# Patient Record
Sex: MALE | Race: WHITE | NOT HISPANIC OR LATINO | Employment: FULL TIME | URBAN - METROPOLITAN AREA
[De-identification: names, ages, dates, MRNs, and addresses within clinical notes are randomized per-mention and may not be internally consistent; named-entity substitution may affect disease eponyms.]

---

## 2017-03-28 ENCOUNTER — HISTORICAL (OUTPATIENT)
Dept: ADMINISTRATIVE | Facility: HOSPITAL | Age: 27
End: 2017-03-28

## 2017-08-18 ENCOUNTER — HISTORICAL (OUTPATIENT)
Dept: LAB | Facility: HOSPITAL | Age: 27
End: 2017-08-18

## 2022-04-07 ENCOUNTER — HISTORICAL (OUTPATIENT)
Dept: ADMINISTRATIVE | Facility: HOSPITAL | Age: 32
End: 2022-04-07

## 2022-04-24 VITALS — OXYGEN SATURATION: 99 % | SYSTOLIC BLOOD PRESSURE: 145 MMHG | DIASTOLIC BLOOD PRESSURE: 78 MMHG

## 2024-01-05 ENCOUNTER — OFFICE VISIT (OUTPATIENT)
Dept: URGENT CARE | Facility: CLINIC | Age: 34
End: 2024-01-05
Payer: COMMERCIAL

## 2024-01-05 VITALS
HEIGHT: 69 IN | WEIGHT: 135 LBS | OXYGEN SATURATION: 99 % | HEART RATE: 84 BPM | TEMPERATURE: 98 F | SYSTOLIC BLOOD PRESSURE: 142 MMHG | RESPIRATION RATE: 18 BRPM | BODY MASS INDEX: 19.99 KG/M2 | DIASTOLIC BLOOD PRESSURE: 95 MMHG

## 2024-01-05 DIAGNOSIS — M54.9 BACK PAIN, UNSPECIFIED BACK LOCATION, UNSPECIFIED BACK PAIN LATERALITY, UNSPECIFIED CHRONICITY: Primary | ICD-10-CM

## 2024-01-05 LAB
BILIRUB UR QL STRIP: NEGATIVE
GLUCOSE UR QL STRIP: NEGATIVE
KETONES UR QL STRIP: NEGATIVE
LEUKOCYTE ESTERASE UR QL STRIP: NEGATIVE
PH, POC UA: 7
POC BLOOD, URINE: NEGATIVE
POC NITRATES, URINE: NEGATIVE
PROT UR QL STRIP: NEGATIVE
SP GR UR STRIP: 1 (ref 1–1.03)
UROBILINOGEN UR STRIP-ACNC: NORMAL (ref 0.3–2.2)

## 2024-01-05 PROCEDURE — 99203 OFFICE O/P NEW LOW 30 MIN: CPT | Mod: ,,, | Performed by: PHYSICIAN ASSISTANT

## 2024-01-05 PROCEDURE — 81003 URINALYSIS AUTO W/O SCOPE: CPT | Mod: QW,,, | Performed by: PHYSICIAN ASSISTANT

## 2024-01-05 RX ORDER — METHOCARBAMOL 500 MG/1
1000 TABLET, FILM COATED ORAL 3 TIMES DAILY
Qty: 30 TABLET | Refills: 0 | Status: SHIPPED | OUTPATIENT
Start: 2024-01-05 | End: 2024-01-10

## 2024-01-05 RX ORDER — PREDNISONE 10 MG/1
10 TABLET ORAL 2 TIMES DAILY
Qty: 10 TABLET | Refills: 0 | Status: SHIPPED | OUTPATIENT
Start: 2024-01-05 | End: 2024-01-10

## 2024-01-05 NOTE — PROGRESS NOTES
"Subjective:      Patient ID: Chay Bailey is a 33 y.o. male.    Vitals:  height is 5' 9" (1.753 m) and weight is 61.2 kg (135 lb). His oral temperature is 98.1 °F (36.7 °C). His blood pressure is 142/95 (abnormal) and his pulse is 84. His respiration is 18 and oxygen saturation is 99%.     Chief Complaint: Back Pain (Pt c/o lower back pain, near left kidney region. Pt states pain sometimes radiates to front side of abdomen. Symptoms x1 month. Pt states dull pain, and thought it was a pulled muscle from carrying large bag, but pain has been persistent. )    HPI  male  reports 4 weeks ago traveling offshore to [place] nonfunctional helicopter lifting 50 lb shackles reports having dull left backache fluctuating over the last 4 weeks presents to urgent Care for initial evaluation.  Patient reports previously examined by primary care office with lumbar radicular left thigh symptoms reports encourage physical therapy not yet evaluated.  Patient denies dysuria hematuria, urinary frequency.   Back Pain     Additional comments: Pt c/o lower back pain, near left kidney region. Pt   states pain sometimes radiates to front side of abdomen. Symptoms x1   month. Pt states dull pain, and thought it was a pulled muscle from   carrying large bag, but pain has been persistent.     Back Pain  The current episode started 1 to 4 weeks ago. The problem has been waxing and waning since onset. The quality of the pain is described as aching. Pertinent negatives include no abdominal pain, dysuria, fever or numbness.       Constitution: Negative for fatigue and fever.   Neck: Negative for neck pain.   Cardiovascular: Negative.    Respiratory: Negative.     Gastrointestinal: Negative.  Negative for abdominal pain, nausea and vomiting.   Genitourinary:  Negative for dysuria, frequency, urgency and hematuria.   Musculoskeletal:  Positive for pain, back pain and muscle ache. Negative for joint pain, joint swelling and abnormal " ROM of joint.   Skin: Negative.  Negative for erythema.   Neurological:  Positive for tingling. Negative for numbness.      Objective:     Physical Exam   Constitutional: He is oriented to person, place, and time. He appears well-developed. He is cooperative. No distress.      Comments:Awake alert pleasant ambulatory male     HENT:   Head: Normocephalic.   Mouth/Throat: Oropharynx is clear and moist and mucous membranes are normal.   Eyes: Conjunctivae and lids are normal.   Neck: Trachea normal and phonation normal. Neck supple.   Cardiovascular: Normal rate, regular rhythm and normal pulses.   Pulmonary/Chest: Effort normal. No respiratory distress.   Abdominal: Normal appearance. He exhibits no mass. Soft. flat abdomen There is no abdominal tenderness. There is no guarding, no left CVA tenderness and no right CVA tenderness.   Musculoskeletal: Normal range of motion.         General: No swelling or signs of injury. Normal range of motion.      Cervical back: He exhibits no tenderness.      Thoracic back: He exhibits no tenderness and no bony tenderness.      Lumbar back: He exhibits tenderness. He exhibits normal range of motion, no bony tenderness, no swelling and no edema.        Back:    Neurological: no focal deficit. He is alert and oriented to person, place, and time. He has normal strength and normal reflexes. He displays no weakness. No sensory deficit. Gait normal.   Skin: Skin is warm, dry, intact, not diaphoretic and no rash. No erythema and No lesion   Psychiatric: His speech is normal and behavior is normal. Mood, judgment and thought content normal.   Nursing note and vitals reviewed.         Previous History      Review of patient's allergies indicates:  No Known Allergies    Past Medical History:   Diagnosis Date    Known health problems: none      Current Outpatient Medications   Medication Instructions    methocarbamoL (ROBAXIN) 1,000 mg, Oral, 3 times daily    predniSONE (DELTASONE) 10 mg,  "Oral, 2 times daily     Past Surgical History:   Procedure Laterality Date    LUNG SURGERY Left 2016     Family History   Problem Relation Age of Onset    No Known Problems Mother     No Known Problems Father     No Known Problems Sister     No Known Problems Brother        Social History     Tobacco Use    Smoking status: Never     Passive exposure: Never    Smokeless tobacco: Never   Substance Use Topics    Alcohol use: Yes    Drug use: Never        Physical Exam      Vital Signs Reviewed   BP (!) 142/95   Pulse 84   Temp 98.1 °F (36.7 °C) (Oral)   Resp 18   Ht 5' 9" (1.753 m)   Wt 61.2 kg (135 lb)   SpO2 99%   BMI 19.94 kg/m²        Procedures    Procedures     Labs     Results for orders placed or performed in visit on 01/05/24   POCT Urinalysis, Dipstick, Automated, W/O Scope   Result Value Ref Range    POC Blood, Urine Negative Negative, Positive Slide, Positive Tube    POC Bilirubin, Urine Negative Negative, Positive Slide, Positive Tube    POC Urobilinogen, Urine norm 0.3 - 2.2    POC Ketones, Urine Negative Negative, Positive Slide, Positive Tube    POC Protein, Urine Negative Negative, Positive Slide, Positive Tube    POC Nitrates, Urine Negative Negative, Positive Slide, Positive Tube    POC Glucose, Urine Negative Negative, Positive Slide, Positive Tube    pH, UA 7     POC Specific Gravity, Urine 1.005 1.003 - 1.029    POC Leukocytes, Urine Negative Negative, Positive Slide, Positive Tube     X-Ray Lumbar Spine 2 Or 3 Views  Order: [id number]  Status: Final result       Visible to patient: No (inaccessible in Patient Portal)       Next appt: None       Dx: Back pain, unspecified back location,...    0 Result Notes  Details    Reading Physician Reading Date Result Priority   Savanah Sow MD  [phone] 1/5/2024 STAT     Narrative & Impression  EXAMINATION:  XR LUMBAR SPINE 2 OR 3 VIEWS     CLINICAL HISTORY:  Dorsalgia, unspecified left lumbar radicular pain;     COMPARISON:  None.   "   FINDINGS:  There are 5 non-rib-bearing lumbar type vertebral bodies.  Alignment is normal.  The vertebral body heights and disc spaces are maintained.  The soft tissues are unremarkable.     Impression:     No acute abnormality identified.        Electronically signed by: Savanah Sow  Date:                                            01/05/2024     Assessment:     1. Back pain, unspecified back location, unspecified back pain laterality, unspecified chronicity      Patient ambulatory around clinic with no incontinence, no saddle paresthesia.  Discussed urinalysis results and lumbar x-ray radiologist's final report while in clinic concern for lumbar strain with past lumbar radicular symptoms.  Patient prefers non medication options accepts first-line NSAID, over-the-counter medication with backup prescription prednisolone muscle relaxer and physical therapy referral.  Plan:   High concern for lumbar strain and lumbar radicular pain.  Recommend topical creams line massages to sore lower back areas.  Alternate Tylenol and ibuprofen every 6-8 hours if needed for mild-to-moderate pain and inflammation.  May add prednisolone steroid to help reduce backaches pain and inflammation.  Robaxin muscle relaxer sparingly lowest dose if needed for severe pain tension cramps stiffness tightness or spasms.  Physical therapy referral sent today potential contacts in the next week for appointment and evaluation.  Recommend follow-up with primary care physician in 1-2 weeks for re-evaluation if not improving.    Back pain, unspecified back location, unspecified back pain laterality, unspecified chronicity  -     POCT Urinalysis, Dipstick, Automated, W/O Scope  -     X-Ray Lumbar Spine 2 Or 3 Views; Future; Expected date: 01/05/2024  -     Ambulatory referral/consult to Physical/Occupational Therapy    Other orders  -     predniSONE (DELTASONE) 10 MG tablet; Take 1 tablet (10 mg total) by mouth 2 (two) times daily. for 5 days   Dispense: 10 tablet; Refill: 0  -     methocarbamoL (ROBAXIN) 500 MG Tab; Take 2 tablets (1,000 mg total) by mouth 3 (three) times daily. for 5 days  Dispense: 30 tablet; Refill: 0

## 2024-01-05 NOTE — PATIENT INSTRUCTIONS
High concern for lumbar strain and lumbar radicular pain.  Recommend topical creams line massages to sore lower back areas.  Alternate Tylenol and ibuprofen every 6-8 hours if needed for mild-to-moderate pain and inflammation.  May add prednisolone steroid to help reduce backaches pain and inflammation.  Robaxin muscle relaxer sparingly lowest dose if needed for severe pain tension cramps stiffness tightness or spasms.  Physical therapy referral sent today potential contacts in the next week for appointment and evaluation.  Recommend follow-up with primary care physician in 1-2 weeks for re-evaluation if not improving.

## 2025-05-07 ENCOUNTER — OFFICE VISIT (OUTPATIENT)
Dept: URGENT CARE | Facility: CLINIC | Age: 35
End: 2025-05-07
Payer: COMMERCIAL

## 2025-05-07 ENCOUNTER — RESULTS FOLLOW-UP (OUTPATIENT)
Dept: URGENT CARE | Facility: CLINIC | Age: 35
End: 2025-05-07

## 2025-05-07 VITALS
SYSTOLIC BLOOD PRESSURE: 160 MMHG | TEMPERATURE: 98 F | OXYGEN SATURATION: 99 % | HEIGHT: 69 IN | RESPIRATION RATE: 20 BRPM | DIASTOLIC BLOOD PRESSURE: 90 MMHG | WEIGHT: 134 LBS | BODY MASS INDEX: 19.85 KG/M2 | HEART RATE: 96 BPM

## 2025-05-07 DIAGNOSIS — I45.10 INCOMPLETE RIGHT BUNDLE BRANCH BLOCK: ICD-10-CM

## 2025-05-07 DIAGNOSIS — R07.89 CHEST PRESSURE: Primary | ICD-10-CM

## 2025-05-07 PROCEDURE — 99214 OFFICE O/P EST MOD 30 MIN: CPT | Mod: ,,, | Performed by: FAMILY MEDICINE

## 2025-05-07 NOTE — PATIENT INSTRUCTIONS
EKG shows incomplete right bundle-branch block.   Preliminary read of chest x-ray is negative.  We will call with results when negative.   Because your EKG findings are chronic, RBBB is not a new finding, and you have low risk factors we will follow up with Cardiology.   In the meantime if you have any new or worsening chest pain, syncope, sweating, fatigue, confusion, nausea, vomiting, or any new or worrisome symptoms that arise at home call 911 go directly to your nearest emergency room.

## 2025-05-07 NOTE — PROGRESS NOTES
"Subjective:      Patient ID: Chay Bailey is a 35 y.o. male.    Vitals:  height is 5' 9" (1.753 m) and weight is 60.8 kg (134 lb). His oral temperature is 98.2 °F (36.8 °C). His blood pressure is 160/90 (abnormal) and his pulse is 96. His respiration is 20 and oxygen saturation is 99%.     Chief Complaint: Chest Pain     Patient is a 35 y.o. male who presents to urgent care with complaints of chest pressure(feels like something is sitting on his chest) x3-4. When wakes up in the AM has shortness of breath.  He says that his symptoms are mild, and improve during the day until they eventually completely resolve.  He denies heavy alcohol use, cocaine use, drug use, and he does not take any daily medications.  He denies abdominal pain, nausea, vomiting, syncope, diaphoresis, malaise, palpitations, headache, cough, hemoptysis, chest trauma.  He denies reflux symptoms.  Patient denies a history of hypertension, says that his pressure is always elevated at the doctor's office.  He says that it does monitor his blood pressure at home and readings are typically 120s to 30s/ 80s.     Chest Pain   Associated symptoms include shortness of breath (Morning). Pertinent negatives include no cough, diaphoresis, dizziness, fever, hemoptysis, palpitations, sputum production or syncope.     Constitution: Negative for chills, sweating, fatigue and fever.   HENT: Negative.     Neck: neck negative.   Cardiovascular:  Positive for chest pain. Negative for chest trauma, leg swelling, palpitations, sob on exertion and passing out.   Eyes: Negative.    Respiratory:  Positive for shortness of breath (Morning). Negative for chest tightness, cough, sputum production, bloody sputum, stridor, wheezing and asthma.    Gastrointestinal: Negative.    Musculoskeletal: Negative.    Allergic/Immunologic: Negative for asthma.   Neurological: Negative.  Negative for dizziness, light-headedness, passing out, facial drooping, speech difficulty, coordination " disturbances, disorientation and altered mental status.   Hematologic/Lymphatic: Negative.    Psychiatric/Behavioral:  Negative for altered mental status, disorientation and confusion.       Objective:     Physical Exam   Constitutional: He is oriented to person, place, and time. He appears well-developed. He is cooperative.   HENT:   Head: Normocephalic and atraumatic.   Ears:   Right Ear: Hearing, tympanic membrane, external ear and ear canal normal.   Left Ear: Hearing, tympanic membrane, external ear and ear canal normal.   Nose: Nose normal. No mucosal edema or nasal deformity. No epistaxis. Right sinus exhibits no maxillary sinus tenderness and no frontal sinus tenderness. Left sinus exhibits no maxillary sinus tenderness and no frontal sinus tenderness.   Mouth/Throat: Uvula is midline, oropharynx is clear and moist and mucous membranes are normal. No trismus in the jaw. Normal dentition. No uvula swelling.   Eyes: Conjunctivae and lids are normal.   Neck: Trachea normal and phonation normal. Neck supple.   Cardiovascular: Normal rate, regular rhythm, normal heart sounds and normal pulses.   Pulmonary/Chest: Effort normal and breath sounds normal. No respiratory distress. He has no wheezes. He has no rhonchi.   Abdominal: Normal appearance and bowel sounds are normal. Soft.   Musculoskeletal: Normal range of motion.         General: Normal range of motion.   Neurological: He is alert and oriented to person, place, and time. He exhibits normal muscle tone.   Skin: Skin is warm, dry and intact.   Psychiatric: His speech is normal and behavior is normal. Judgment and thought content normal.   Nursing note and vitals reviewed.         Previous History      Review of patient's allergies indicates:  No Known Allergies    Past Medical History:   Diagnosis Date    Known health problems: none      No current outpatient medications  Past Surgical History:   Procedure Laterality Date    LUNG SURGERY Left 2016     Family  "History   Problem Relation Name Age of Onset    No Known Problems Mother      No Known Problems Father      No Known Problems Sister      No Known Problems Brother         Social History[1]     Physical Exam      Vital Signs Reviewed   BP (!) 160/90 (BP Location: Left arm)   Pulse 96   Temp 98.2 °F (36.8 °C) (Oral)   Resp 20   Ht 5' 9" (1.753 m)   Wt 60.8 kg (134 lb)   SpO2 99%   BMI 19.79 kg/m²        Procedures    Procedures     Labs     Results for orders placed or performed in visit on 01/05/24   POCT Urinalysis, Dipstick, Automated, W/O Scope    Collection Time: 01/05/24  3:22 PM   Result Value Ref Range    POC Blood, Urine Negative Negative, Positive Slide, Positive Tube    POC Bilirubin, Urine Negative Negative, Positive Slide, Positive Tube    POC Urobilinogen, Urine norm 0.3 - 2.2    POC Ketones, Urine Negative Negative, Positive Slide, Positive Tube    POC Protein, Urine Negative Negative, Positive Slide, Positive Tube    POC Nitrates, Urine Negative Negative, Positive Slide, Positive Tube    POC Glucose, Urine Negative Negative, Positive Slide, Positive Tube    pH, UA 7     POC Specific Gravity, Urine 1.005 1.003 - 1.029    POC Leukocytes, Urine Negative Negative, Positive Slide, Positive Tube      Assessment:     1. Chest pressure    2. Incomplete right bundle branch block        Plan:   EKG shows an incomplete right bundle-branch block.  PERC score is 0. According to patient's history see says this is a chronic finding knee was diagnosed with this 2022.  He says that he was seen by Cardiology where he had a stress test performed that was negative.  You get a CT calcium score exam done 2 months ago with a score of 0. He says that he was released from Cardiology due to negative testing symptomatic improvement. I have discussed with him that the best plan of care would be to go to his nearest emergency room for further evaluation to rule out any emergent issues.  He verbalizes understanding but " would like to follow up outpatient with Cardiology as he says his symptoms are very mild and transient, in the EKG findings today he says he has already been diagnosed with.     EKG shows incomplete right bundle-branch block.   Preliminary read of chest x-ray is negative.  We will call with results when negative.   Because your EKG findings are chronic, RBBB is not a new finding, and you have low risk factors we will follow up with Cardiology.   In the meantime if you have any new or worsening chest pain, syncope, sweating, fatigue, confusion, nausea, vomiting, or any new or worrisome symptoms that arise at home call 911 go directly to your nearest emergency room.     Chest pressure  -     POCT EKG 12-LEAD (Manually Resulted by Ordering Provider)  -     XR CHEST PA AND LATERAL; Future; Expected date: 05/07/2025  -     Ambulatory referral/consult to Cardiology    Incomplete right bundle branch block                         [1]   Social History  Tobacco Use    Smoking status: Never     Passive exposure: Never    Smokeless tobacco: Never   Substance Use Topics    Alcohol use: Yes    Drug use: Never